# Patient Record
Sex: FEMALE | Race: WHITE | Employment: OTHER | URBAN - METROPOLITAN AREA
[De-identification: names, ages, dates, MRNs, and addresses within clinical notes are randomized per-mention and may not be internally consistent; named-entity substitution may affect disease eponyms.]

---

## 2017-05-13 ENCOUNTER — APPOINTMENT (OUTPATIENT)
Dept: CT IMAGING | Age: 42
End: 2017-05-13
Payer: MEDICARE

## 2017-05-13 ENCOUNTER — HOSPITAL ENCOUNTER (EMERGENCY)
Age: 42
Discharge: HOME | End: 2017-05-13
Attending: EMERGENCY MEDICINE
Payer: MEDICARE

## 2017-05-13 VITALS
DIASTOLIC BLOOD PRESSURE: 64 MMHG | HEIGHT: 58 IN | WEIGHT: 206.79 LBS | HEART RATE: 50 BPM | RESPIRATION RATE: 12 BRPM | OXYGEN SATURATION: 95 % | SYSTOLIC BLOOD PRESSURE: 103 MMHG | TEMPERATURE: 97.3 F | BODY MASS INDEX: 43.41 KG/M2

## 2017-05-13 DIAGNOSIS — R51.9 NONINTRACTABLE HEADACHE, UNSPECIFIED CHRONICITY PATTERN, UNSPECIFIED HEADACHE TYPE: Primary | ICD-10-CM

## 2017-05-13 PROCEDURE — 6370000000 HC RX 637 (ALT 250 FOR IP): Performed by: NURSE PRACTITIONER

## 2017-05-13 PROCEDURE — 99284 EMERGENCY DEPT VISIT MOD MDM: CPT

## 2017-05-13 PROCEDURE — 96372 THER/PROPH/DIAG INJ SC/IM: CPT

## 2017-05-13 PROCEDURE — 6360000002 HC RX W HCPCS: Performed by: NURSE PRACTITIONER

## 2017-05-13 PROCEDURE — 70450 CT HEAD/BRAIN W/O DYE: CPT

## 2017-05-13 RX ORDER — KETOROLAC TROMETHAMINE 30 MG/ML
60 INJECTION, SOLUTION INTRAMUSCULAR; INTRAVENOUS ONCE
Status: COMPLETED | OUTPATIENT
Start: 2017-05-13 | End: 2017-05-13

## 2017-05-13 RX ORDER — ONDANSETRON 4 MG/1
4 TABLET, ORALLY DISINTEGRATING ORAL ONCE
Status: COMPLETED | OUTPATIENT
Start: 2017-05-13 | End: 2017-05-13

## 2017-05-13 RX ORDER — DIPHENHYDRAMINE HYDROCHLORIDE 50 MG/ML
25 INJECTION INTRAMUSCULAR; INTRAVENOUS ONCE
Status: COMPLETED | OUTPATIENT
Start: 2017-05-13 | End: 2017-05-13

## 2017-05-13 RX ORDER — KETOROLAC TROMETHAMINE 30 MG/ML
60 INJECTION, SOLUTION INTRAMUSCULAR; INTRAVENOUS ONCE
Status: DISCONTINUED | OUTPATIENT
Start: 2017-05-13 | End: 2017-05-13

## 2017-05-13 RX ORDER — BUTALBITAL, ACETAMINOPHEN AND CAFFEINE 50; 325; 40 MG/1; MG/1; MG/1
1 TABLET ORAL ONCE
Status: COMPLETED | OUTPATIENT
Start: 2017-05-13 | End: 2017-05-13

## 2017-05-13 RX ORDER — RIZATRIPTAN BENZOATE 10 MG/1
10 TABLET ORAL
COMMUNITY

## 2017-05-13 RX ADMIN — BUTALBITAL, ACETAMINOPHEN, AND CAFFEINE 1 TABLET: 50; 325; 40 TABLET ORAL at 11:55

## 2017-05-13 RX ADMIN — ONDANSETRON 4 MG: 4 TABLET, ORALLY DISINTEGRATING ORAL at 11:57

## 2017-05-13 RX ADMIN — DIPHENHYDRAMINE HYDROCHLORIDE 25 MG: 50 INJECTION, SOLUTION INTRAMUSCULAR; INTRAVENOUS at 12:40

## 2017-05-13 RX ADMIN — KETOROLAC TROMETHAMINE 60 MG: 30 INJECTION, SOLUTION INTRAMUSCULAR at 11:56

## 2017-05-13 ASSESSMENT — PAIN SCALES - GENERAL
PAINLEVEL_OUTOF10: 9
PAINLEVEL_OUTOF10: 9
PAINLEVEL_OUTOF10: 8
PAINLEVEL_OUTOF10: 9

## 2017-05-13 ASSESSMENT — ENCOUNTER SYMPTOMS
SHORTNESS OF BREATH: 0
RHINORRHEA: 0
SORE THROAT: 0
ABDOMINAL PAIN: 0
NAUSEA: 1
EYE PAIN: 0
COUGH: 0
PHOTOPHOBIA: 0
COLOR CHANGE: 0
VOMITING: 0
SINUS PRESSURE: 0

## 2017-05-13 ASSESSMENT — PAIN DESCRIPTION - DESCRIPTORS
DESCRIPTORS: PRESSURE
DESCRIPTORS: ACHING;THROBBING

## 2017-05-13 ASSESSMENT — PAIN DESCRIPTION - PROGRESSION: CLINICAL_PROGRESSION: NOT CHANGED

## 2017-05-13 ASSESSMENT — PAIN DESCRIPTION - LOCATION
LOCATION: HEAD
LOCATION: HEAD

## 2017-05-13 ASSESSMENT — PAIN DESCRIPTION - PAIN TYPE: TYPE: ACUTE PAIN;CHRONIC PAIN

## 2018-06-10 ENCOUNTER — APPOINTMENT (OUTPATIENT)
Dept: GENERAL RADIOLOGY | Facility: CLINIC | Age: 43
End: 2018-06-10
Payer: MEDICARE

## 2018-06-10 ENCOUNTER — HOSPITAL ENCOUNTER (EMERGENCY)
Facility: CLINIC | Age: 43
Discharge: HOME | End: 2018-06-10
Attending: EMERGENCY MEDICINE
Payer: MEDICARE

## 2018-06-10 VITALS
RESPIRATION RATE: 18 BRPM | HEIGHT: 58 IN | HEART RATE: 98 BPM | WEIGHT: 200 LBS | BODY MASS INDEX: 41.98 KG/M2 | SYSTOLIC BLOOD PRESSURE: 119 MMHG | TEMPERATURE: 98 F | DIASTOLIC BLOOD PRESSURE: 78 MMHG | OXYGEN SATURATION: 96 %

## 2018-06-10 DIAGNOSIS — S39.92XA INJURY OF SACRUM, INITIAL ENCOUNTER: Primary | ICD-10-CM

## 2018-06-10 PROCEDURE — 72100 X-RAY EXAM L-S SPINE 2/3 VWS: CPT

## 2018-06-10 PROCEDURE — 99284 EMERGENCY DEPT VISIT MOD MDM: CPT

## 2018-06-10 PROCEDURE — 72220 X-RAY EXAM SACRUM TAILBONE: CPT

## 2018-06-10 RX ORDER — CYCLOBENZAPRINE HCL 10 MG
10 TABLET ORAL 3 TIMES DAILY PRN
Qty: 21 TABLET | Refills: 0 | Status: SHIPPED | OUTPATIENT
Start: 2018-06-10 | End: 2018-06-17

## 2018-06-10 RX ORDER — CLONAZEPAM 1 MG/1
1 TABLET ORAL 2 TIMES DAILY
COMMUNITY

## 2018-06-10 RX ORDER — IBUPROFEN 600 MG/1
600 TABLET ORAL EVERY 6 HOURS PRN
Qty: 30 TABLET | Refills: 0 | Status: SHIPPED | OUTPATIENT
Start: 2018-06-10

## 2018-06-10 ASSESSMENT — PAIN DESCRIPTION - PAIN TYPE: TYPE: ACUTE PAIN

## 2018-06-10 ASSESSMENT — PAIN DESCRIPTION - FREQUENCY: FREQUENCY: CONTINUOUS

## 2018-06-10 ASSESSMENT — ENCOUNTER SYMPTOMS: BACK PAIN: 1

## 2018-06-10 ASSESSMENT — PAIN DESCRIPTION - LOCATION: LOCATION: SACRUM

## 2018-06-10 ASSESSMENT — PAIN DESCRIPTION - DESCRIPTORS: DESCRIPTORS: SHARP

## 2018-06-10 ASSESSMENT — PAIN SCALES - GENERAL: PAINLEVEL_OUTOF10: 8

## 2020-09-14 ENCOUNTER — APPOINTMENT (OUTPATIENT)
Dept: GENERAL RADIOLOGY | Age: 45
DRG: 918 | End: 2020-09-14
Payer: MEDICARE

## 2020-09-14 ENCOUNTER — HOSPITAL ENCOUNTER (INPATIENT)
Age: 45
LOS: 2 days | Discharge: PSYCHIATRIC OR SUBSTANCE USE FACILITY | DRG: 918 | End: 2020-09-16
Attending: STUDENT IN AN ORGANIZED HEALTH CARE EDUCATION/TRAINING PROGRAM | Admitting: INTERNAL MEDICINE
Payer: MEDICARE

## 2020-09-14 PROBLEM — T14.91XA SUICIDE ATTEMPT (HCC): Status: ACTIVE | Noted: 2020-09-14

## 2020-09-14 LAB
ABSOLUTE EOS #: 0.05 K/UL (ref 0–0.44)
ABSOLUTE IMMATURE GRANULOCYTE: 0.05 K/UL (ref 0–0.3)
ABSOLUTE LYMPH #: 2.12 K/UL (ref 1.1–3.7)
ABSOLUTE MONO #: 0.36 K/UL (ref 0.1–1.2)
ACETAMINOPHEN LEVEL: <5 UG/ML (ref 10–30)
ALBUMIN SERPL-MCNC: 3.6 G/DL (ref 3.5–5.2)
ALBUMIN/GLOBULIN RATIO: 1 (ref 1–2.5)
ALP BLD-CCNC: 70 U/L (ref 35–104)
ALT SERPL-CCNC: 11 U/L (ref 5–33)
AMPHETAMINE SCREEN URINE: NEGATIVE
ANION GAP SERPL CALCULATED.3IONS-SCNC: 13 MMOL/L (ref 9–17)
AST SERPL-CCNC: 23 U/L
BARBITURATE SCREEN URINE: NEGATIVE
BASOPHILS # BLD: 1 % (ref 0–2)
BASOPHILS ABSOLUTE: 0.07 K/UL (ref 0–0.2)
BENZODIAZEPINE SCREEN, URINE: NEGATIVE
BILIRUB SERPL-MCNC: 0.23 MG/DL (ref 0.3–1.2)
BUN BLDV-MCNC: 14 MG/DL (ref 6–20)
BUN/CREAT BLD: ABNORMAL (ref 9–20)
BUPRENORPHINE URINE: NORMAL
CALCIUM SERPL-MCNC: 9 MG/DL (ref 8.6–10.4)
CANNABINOID SCREEN URINE: NEGATIVE
CHLORIDE BLD-SCNC: 104 MMOL/L (ref 98–107)
CO2: 19 MMOL/L (ref 20–31)
COCAINE METABOLITE, URINE: NEGATIVE
CREAT SERPL-MCNC: 1.01 MG/DL (ref 0.5–0.9)
DIFFERENTIAL TYPE: ABNORMAL
EOSINOPHILS RELATIVE PERCENT: 1 % (ref 1–4)
ETHANOL PERCENT: <0.01 %
ETHANOL: <10 MG/DL
GFR AFRICAN AMERICAN: >60 ML/MIN
GFR NON-AFRICAN AMERICAN: 59 ML/MIN
GFR SERPL CREATININE-BSD FRML MDRD: ABNORMAL ML/MIN/{1.73_M2}
GFR SERPL CREATININE-BSD FRML MDRD: ABNORMAL ML/MIN/{1.73_M2}
GLUCOSE BLD-MCNC: 132 MG/DL (ref 70–99)
HCG QUALITATIVE: NEGATIVE
HCT VFR BLD CALC: 40.8 % (ref 36.3–47.1)
HEMOGLOBIN: 12.8 G/DL (ref 11.9–15.1)
IMMATURE GRANULOCYTES: 1 %
LYMPHOCYTES # BLD: 26 % (ref 24–43)
MAGNESIUM: 2 MG/DL (ref 1.6–2.6)
MCH RBC QN AUTO: 27.5 PG (ref 25.2–33.5)
MCHC RBC AUTO-ENTMCNC: 31.4 G/DL (ref 28.4–34.8)
MCV RBC AUTO: 87.7 FL (ref 82.6–102.9)
MDMA URINE: NORMAL
METHADONE SCREEN, URINE: NEGATIVE
METHAMPHETAMINE, URINE: NORMAL
MONOCYTES # BLD: 5 % (ref 3–12)
NRBC AUTOMATED: 0 PER 100 WBC
OPIATES, URINE: NEGATIVE
OXYCODONE SCREEN URINE: NEGATIVE
PDW BLD-RTO: 14.1 % (ref 11.8–14.4)
PHENCYCLIDINE, URINE: NEGATIVE
PLATELET # BLD: 345 K/UL (ref 138–453)
PLATELET ESTIMATE: ABNORMAL
PMV BLD AUTO: 10 FL (ref 8.1–13.5)
POTASSIUM SERPL-SCNC: 4.6 MMOL/L (ref 3.7–5.3)
PROPOXYPHENE, URINE: NORMAL
RBC # BLD: 4.65 M/UL (ref 3.95–5.11)
RBC # BLD: ABNORMAL 10*6/UL
SALICYLATE LEVEL: <1 MG/DL (ref 3–10)
SEG NEUTROPHILS: 66 % (ref 36–65)
SEGMENTED NEUTROPHILS ABSOLUTE COUNT: 5.4 K/UL (ref 1.5–8.1)
SODIUM BLD-SCNC: 136 MMOL/L (ref 135–144)
TEST INFORMATION: NORMAL
TOTAL PROTEIN: 7.1 G/DL (ref 6.4–8.3)
TOXIC TRICYCLIC SC,BLOOD: NEGATIVE
TRICYCLIC ANTIDEPRESSANTS, UR: NORMAL
TROPONIN INTERP: NORMAL
TROPONIN INTERP: NORMAL
TROPONIN T: NORMAL NG/ML
TROPONIN T: NORMAL NG/ML
TROPONIN, HIGH SENSITIVITY: <6 NG/L (ref 0–14)
TROPONIN, HIGH SENSITIVITY: <6 NG/L (ref 0–14)
TSH SERPL DL<=0.05 MIU/L-ACNC: 0.99 MIU/L (ref 0.3–5)
WBC # BLD: 8.1 K/UL (ref 3.5–11.3)
WBC # BLD: ABNORMAL 10*3/UL

## 2020-09-14 PROCEDURE — 83735 ASSAY OF MAGNESIUM: CPT

## 2020-09-14 PROCEDURE — 6360000002 HC RX W HCPCS: Performed by: STUDENT IN AN ORGANIZED HEALTH CARE EDUCATION/TRAINING PROGRAM

## 2020-09-14 PROCEDURE — 80053 COMPREHEN METABOLIC PANEL: CPT

## 2020-09-14 PROCEDURE — 93005 ELECTROCARDIOGRAM TRACING: CPT | Performed by: GENERAL PRACTICE

## 2020-09-14 PROCEDURE — 84703 CHORIONIC GONADOTROPIN ASSAY: CPT

## 2020-09-14 PROCEDURE — 85025 COMPLETE CBC W/AUTO DIFF WBC: CPT

## 2020-09-14 PROCEDURE — 99223 1ST HOSP IP/OBS HIGH 75: CPT | Performed by: INTERNAL MEDICINE

## 2020-09-14 PROCEDURE — 80307 DRUG TEST PRSMV CHEM ANLYZR: CPT

## 2020-09-14 PROCEDURE — 84484 ASSAY OF TROPONIN QUANT: CPT

## 2020-09-14 PROCEDURE — 71045 X-RAY EXAM CHEST 1 VIEW: CPT

## 2020-09-14 PROCEDURE — 2000000000 HC ICU R&B

## 2020-09-14 PROCEDURE — 2580000003 HC RX 258: Performed by: STUDENT IN AN ORGANIZED HEALTH CARE EDUCATION/TRAINING PROGRAM

## 2020-09-14 PROCEDURE — G0480 DRUG TEST DEF 1-7 CLASSES: HCPCS

## 2020-09-14 PROCEDURE — 37799 UNLISTED PX VASCULAR SURGERY: CPT

## 2020-09-14 PROCEDURE — 2580000003 HC RX 258: Performed by: GENERAL PRACTICE

## 2020-09-14 PROCEDURE — 84443 ASSAY THYROID STIM HORMONE: CPT

## 2020-09-14 PROCEDURE — 99285 EMERGENCY DEPT VISIT HI MDM: CPT

## 2020-09-14 PROCEDURE — 87641 MR-STAPH DNA AMP PROBE: CPT

## 2020-09-14 RX ORDER — SODIUM CHLORIDE 9 MG/ML
INJECTION, SOLUTION INTRAVENOUS CONTINUOUS
Status: DISCONTINUED | OUTPATIENT
Start: 2020-09-14 | End: 2020-09-15

## 2020-09-14 RX ORDER — ZOLPIDEM TARTRATE 10 MG/1
40 TABLET ORAL NIGHTLY PRN
COMMUNITY

## 2020-09-14 RX ORDER — 0.9 % SODIUM CHLORIDE 0.9 %
1000 INTRAVENOUS SOLUTION INTRAVENOUS ONCE
Status: COMPLETED | OUTPATIENT
Start: 2020-09-14 | End: 2020-09-14

## 2020-09-14 RX ORDER — BENZTROPINE MESYLATE 0.5 MG/1
0.5 TABLET ORAL DAILY
COMMUNITY

## 2020-09-14 RX ORDER — MIRTAZAPINE 30 MG/1
30 TABLET, FILM COATED ORAL NIGHTLY
COMMUNITY

## 2020-09-14 RX ORDER — POTASSIUM CHLORIDE 20 MEQ/1
40 TABLET, EXTENDED RELEASE ORAL PRN
Status: DISCONTINUED | OUTPATIENT
Start: 2020-09-14 | End: 2020-09-16 | Stop reason: HOSPADM

## 2020-09-14 RX ORDER — ACETAMINOPHEN 325 MG/1
650 TABLET ORAL EVERY 6 HOURS PRN
Status: DISCONTINUED | OUTPATIENT
Start: 2020-09-14 | End: 2020-09-16 | Stop reason: HOSPADM

## 2020-09-14 RX ORDER — VENLAFAXINE 100 MG/1
300 TABLET ORAL
Status: ON HOLD | COMMUNITY
End: 2020-09-15 | Stop reason: ALTCHOICE

## 2020-09-14 RX ORDER — ARIPIPRAZOLE 20 MG/1
20 TABLET ORAL DAILY
COMMUNITY

## 2020-09-14 RX ORDER — ONDANSETRON 2 MG/ML
4 INJECTION INTRAMUSCULAR; INTRAVENOUS EVERY 6 HOURS PRN
Status: DISCONTINUED | OUTPATIENT
Start: 2020-09-14 | End: 2020-09-16 | Stop reason: HOSPADM

## 2020-09-14 RX ORDER — PROMETHAZINE HYDROCHLORIDE 12.5 MG/1
12.5 TABLET ORAL EVERY 6 HOURS PRN
Status: ON HOLD | COMMUNITY
End: 2020-09-15 | Stop reason: ALTCHOICE

## 2020-09-14 RX ORDER — SODIUM CHLORIDE 0.9 % (FLUSH) 0.9 %
10 SYRINGE (ML) INJECTION PRN
Status: DISCONTINUED | OUTPATIENT
Start: 2020-09-14 | End: 2020-09-16 | Stop reason: HOSPADM

## 2020-09-14 RX ORDER — ACETAMINOPHEN 650 MG/1
650 SUPPOSITORY RECTAL EVERY 6 HOURS PRN
Status: DISCONTINUED | OUTPATIENT
Start: 2020-09-14 | End: 2020-09-16 | Stop reason: HOSPADM

## 2020-09-14 RX ORDER — SODIUM CHLORIDE 0.9 % (FLUSH) 0.9 %
10 SYRINGE (ML) INJECTION EVERY 12 HOURS SCHEDULED
Status: DISCONTINUED | OUTPATIENT
Start: 2020-09-14 | End: 2020-09-16 | Stop reason: HOSPADM

## 2020-09-14 RX ORDER — POTASSIUM CHLORIDE 7.45 MG/ML
10 INJECTION INTRAVENOUS PRN
Status: DISCONTINUED | OUTPATIENT
Start: 2020-09-14 | End: 2020-09-16 | Stop reason: HOSPADM

## 2020-09-14 RX ORDER — PROMETHAZINE HYDROCHLORIDE 12.5 MG/1
12.5 TABLET ORAL EVERY 6 HOURS PRN
Status: DISCONTINUED | OUTPATIENT
Start: 2020-09-14 | End: 2020-09-16 | Stop reason: HOSPADM

## 2020-09-14 RX ORDER — MAGNESIUM OXIDE 400 MG/1
400 TABLET ORAL DAILY
COMMUNITY

## 2020-09-14 RX ORDER — POLYETHYLENE GLYCOL 3350 17 G/17G
17 POWDER, FOR SOLUTION ORAL DAILY PRN
Status: DISCONTINUED | OUTPATIENT
Start: 2020-09-14 | End: 2020-09-16 | Stop reason: HOSPADM

## 2020-09-14 RX ADMIN — SODIUM CHLORIDE 1000 ML: 9 INJECTION, SOLUTION INTRAVENOUS at 12:51

## 2020-09-14 RX ADMIN — SODIUM CHLORIDE 1000 ML: 9 INJECTION, SOLUTION INTRAVENOUS at 18:41

## 2020-09-14 RX ADMIN — ENOXAPARIN SODIUM 40 MG: 40 INJECTION SUBCUTANEOUS at 17:36

## 2020-09-14 RX ADMIN — SODIUM CHLORIDE: 9 INJECTION, SOLUTION INTRAVENOUS at 16:40

## 2020-09-14 ASSESSMENT — ENCOUNTER SYMPTOMS
SHORTNESS OF BREATH: 0
PHOTOPHOBIA: 0
NAUSEA: 0
COUGH: 0
EYE DISCHARGE: 0
VOMITING: 0

## 2020-09-14 ASSESSMENT — PAIN DESCRIPTION - LOCATION: LOCATION: HEAD

## 2020-09-14 ASSESSMENT — PAIN SCALES - GENERAL: PAINLEVEL_OUTOF10: 6

## 2020-09-14 ASSESSMENT — PAIN DESCRIPTION - PAIN TYPE: TYPE: ACUTE PAIN

## 2020-09-14 NOTE — ED PROVIDER NOTES
[facility]     Emergency Department     Faculty Note/ Attestation      Pt Name: Leona Muller                                       MRN: [id number]  [place] 1975  Date of evaluation: 9/14/2020  Patients PCP:    Olga De Jesus MD    Attestation  I performed a history and physical examination of the patient/ or directly observed  and discussed management with the resident. I reviewed the residents note and agree with the documented findings and plan of care. Any areas of disagreement are noted on the chart. I was personally present for the key portions of any procedures. I have documented in the chart those procedures where I was not present during the key portions. I have reviewed the emergency nurses triage note. I agree with the chief complaint, past medical history, past surgical history, allergies, medications, social and family history as documented unless otherwise noted below. For Physician Assistant/ Nurse Practitioner cases/documentation I have personally evaluated this patient and have completed at least one if not all key elements of the E/M (history, physical exam, and MDM). Additional findings are as noted. Initial Screens:             Vitals:    Vitals:    09/14/20 1148 09/14/20 1201   BP:  122/76   Pulse: 88 86   Resp:  14   Temp: 98.5 °F (36.9 °C)    TempSrc: Oral    SpO2: 92% 96%   Weight: 200 lb (90.7 kg)    Height: 4' 10\" (1.473 m)        Chief Complaint      Chief Complaint   Patient presents with    Drug Overdose     intentional to harm self. height is 4' 10\" (1.473 m) and weight is 200 lb (90.7 kg). Her oral temperature is 98.5 °F (36.9 °C). Her blood pressure is 122/76 and her pulse is 86. Her respiration is 14 and oxygen saturation is 96%.             DIAGNOSTIC RESULTS       RADIOLOGY:   XR CHEST PORTABLE    (Results Pending)         LABS:  Labs Reviewed   CBC WITH AUTO DIFFERENTIAL   COMPREHENSIVE METABOLIC PANEL   TOX SCR, BLD, ED TROPONIN   TROPONIN   TSH WITH REFLEX   MAGNESIUM   URINE DRUG SCREEN         EMERGENCY DEPARTMENT COURSE:     -------------------------      BP: 122/76, Temp: 98.5 °F (36.9 °C), Pulse: 86, Resp: 14    System Problem List     Patient Active Problem List   Diagnosis    Severe episode of recurrent major depressive disorder, without psychotic features ([facility]    Hypothyroidism    Headache    PTSD (post-traumatic stress disorder)    Suicide attempt by drug ingestion ([facility]    Hypothyroidism due to acquired atrophy of thyroid         Comments  Chronic Prob List noted    Intentional drug overdose. Hx of prior suicide attempt. Sent here from her psychiatrists office.    Took per report 50 tablets of 10mg Ambien, 12 of 2mg minipress, 3 of 20mg abilify, 3 of 0.5mg cogentin   BP stable on arrival, HR normal  Patient is drowsy, slurring her speech, however answering questions appropriately, moving all extremities  Will check basic labs, tox screen, tsh, ekg, touch base w/ poison control, SW consult will need inpatient psych admission once medically clear    EKG Interpretation    Interpreted by emergency department physician    Rhythm: normal sinus   Rate: normal  Axis: normal  Ectopy: none  Conduction: normal  ST Segments: no acute change  T Waves: non specific changes  Q Waves: none    Clinical Impression: no acute changes and non-specific EKG    Radha Villafana DO  Attending Emergency Physician        Radha Villafana DO  09/14/20 6219

## 2020-09-14 NOTE — ED PROVIDER NOTES
[facility]  Emergency Department Encounter  EmergencyMedicine Resident     Pt Trisha Ramsay  MRN: [id number]  [place] 1975  Date of evaluation: 20  PCP:  Jassi Carmona MD    [address] [place] [address]       Chief Complaint   Patient presents with    Drug Overdose     intentional to harm self. HISTORY OF PRESENT ILLNESS  (Location/Symptom, Timing/Onset, Context/Setting, Quality, Duration, Modifying Factors, Severity.)      Valentino Stains is a 39 y.o. female who presents with suicide attempt. Patient was at her psychiatrist's office, and stated that she took 5010 mg Ambien 12 Minipress, 320 mg Abilify 3 Cogentin. Patient states she has a history of this in the past, try to commit suicide similar to this several years ago for which she required intubation. Patient took these medications approximately 2 hours prior to arrival.  Patient states she feels sleepy denies any pain or any other coingestions other than the ones mentioned above. PAST MEDICAL / SURGICAL / SOCIAL / FAMILY HISTORY      has a past medical history of Depression, Headache(784.0), Hypothyroidism, and PTSD (post-traumatic stress disorder). has a past surgical history that includes Cholecystectomy;  section; and Lithotripsy.     Social History     Socioeconomic History    Marital status:      Spouse name: Not on file    Number of children: Not on file    Years of education: Not on file    Highest education level: Not on file   Occupational History    Not on file   Social Needs    Financial resource strain: Not on file    Food insecurity     Worry: Not on file     Inability: Not on file    Transportation needs     Medical: Not on file     Non-medical: Not on file   Tobacco Use    Smoking status: Former Smoker    Smokeless tobacco: Never Used   Substance and Sexual Activity    Alcohol use: No    Drug use: No    Sexual activity: Not on file   Lifestyle    Physical activity     Days per week: Not on file     Minutes per session: Not on file    Stress: Not on file   Relationships    Social connections     Talks on phone: Not on file     Gets together: Not on file     Attends Hindu service: Not on file     Active member of club or organization: Not on file     Attends meetings of clubs or organizations: Not on file     Relationship status: Not on file    Intimate partner violence     Fear of current or ex partner: Not on file     Emotionally abused: Not on file     Physically abused: Not on file     Forced sexual activity: Not on file   Other Topics Concern    Not on file   Social History Narrative    Not on file       Family History   Problem Relation Age of Onset    COPD Sister     Diabetes Sister     Cancer Mother        Allergies:  Ciprofloxacin    Home Medications:  Prior to Admission medications    Medication Sig Start Date End Date Taking? Authorizing Provider   magnesium oxide (MAG-OX) 400 MG tablet Take 400 mg by mouth daily   Yes Historical Provider, MD   promethazine (PHENERGAN) 12.5 MG tablet Take 12.5 mg by mouth every 6 hours as needed for Nausea   Yes Historical Provider, MD   mirtazapine (REMERON) 30 MG tablet Take 30 mg by mouth nightly   Yes Historical Provider, MD   ARIPiprazole (ABILIFY) 20 MG tablet Take 20 mg by mouth daily   Yes Historical Provider, MD   venlafaxine (EFFEXOR) 100 MG tablet Take 300 mg by mouth every morning (before breakfast)   Yes Historical Provider, MD   benztropine (COGENTIN) 0.5 MG tablet Take 0.5 mg by mouth daily   Yes Historical Provider, MD   zolpidem (AMBIEN) 10 MG tablet Take 40 mg by mouth nightly as needed for Sleep.    Yes Historical Provider, MD   rizatriptan (MAXALT) 10 MG tablet Take 10 mg by mouth once as needed for Migraine May repeat in 2 hours if needed   Yes Historical Provider, MD   prazosin (MINIPRESS) 2 MG capsule Take 1 capsule by mouth nightly 9/21/15  Yes Dian Rawls MD   levothyroxine (SYNTHROID) 88 MCG tablet Take 88 mcg by mouth Daily   Yes Historical Provider, MD   clonazePAM (KLONOPIN) 1 MG tablet Take 1 mg by mouth 2 times daily. Vinny Butts Historical Provider, MD   ibuprofen (ADVIL;MOTRIN) 600 MG tablet Take 1 tablet by mouth every 6 hours as needed for Pain 6/10/18   Pennie Cardona MD       REVIEW OF SYSTEMS    (2-9 systems for level 4, 10 or more for level 5)      Review of Systems   Constitutional: Positive for activity change and fatigue. Negative for fever. Eyes: Negative for photophobia, discharge and visual disturbance. Respiratory: Negative for cough and shortness of breath. Cardiovascular: Negative for chest pain. Gastrointestinal: Negative for nausea and vomiting. Skin: Negative for rash. Neurological: Negative for dizziness and headaches. Psychiatric/Behavioral: Positive for agitation, behavioral problems and confusion. The patient is not nervous/anxious. PHYSICAL EXAM   (up to 7 for level 4, 8 or more for level 5)      INITIAL VITALS:   BP 90/73   Pulse 70   Temp 98.5 °F (36.9 °C) (Oral)   Resp 23   Ht 4' 10\" (1.473 m)   Wt 200 lb (90.7 kg)   LMP 08/14/2020   SpO2 98%   BMI 41.80 kg/m²     Physical Exam  Constitutional:       Comments: Patient is obese, appears drowsy, is not diaphoretic or in acute distress   HENT:      Mouth/Throat:      Mouth: Mucous membranes are moist.      Pharynx: No oropharyngeal exudate or posterior oropharyngeal erythema. Eyes:      Pupils: Pupils are equal, round, and reactive to light. Comments: Horizontal nystagmus noted   Cardiovascular:      Rate and Rhythm: Normal rate. Pulses: Normal pulses. Heart sounds: Normal heart sounds. Pulmonary:      Comments: Patient is tachypneic, lungs are clear to auscultation bilaterally  Abdominal:      General: There is no distension. Tenderness: There is no abdominal tenderness. There is no right CVA tenderness, left CVA tenderness, guarding or rebound. Hernia: No hernia is present.    Skin: General: Skin is warm. Neurological:      Sensory: No sensory deficit.       Comments: Patient is answering questions, speech is slurred, patient appears somnolent   Psychiatric:      Comments: Depressed mood, abnormal thought and judgment         DIFFERENTIAL  DIAGNOSIS     PLAN (Edmond Lazo / Kirk Larson / EKG):  Orders Placed This Encounter   Procedures    MRSA DNA Probe, Nasal    XR CHEST PORTABLE    CBC Auto Differential    Comprehensive Metabolic Panel    TOX SCR, BLD, ED    Troponin    TSH with Reflex    Magnesium    Urine Drug Screen    HCG Qualitative, Serum    Comprehensive Metabolic Panel w/ Reflex to MG    CBC auto differential    Diet NPO Effective Now    Telemetry Monitoring    Vital signs per unit routine    Notify physician    Telemetry monitoring - continuous duration    Strict Bedrest    Daily weights    Intake and output    Neurovascular checks    Monitor for signs/symptoms of urinary retention    Place intermittent pneumatic compression device    Full Code    Inpatient consult to Social Work    Inpatient consult to Critical Care    Initiate Oxygen Therapy Protocol    EKG 12 Lead    PATIENT STATUS (FROM ED OR OR/PROCEDURAL) Inpatient    Suicide precautions       MEDICATIONS ORDERED:  Orders Placed This Encounter   Medications    0.9 % sodium chloride bolus    sodium chloride flush 0.9 % injection 10 mL    sodium chloride flush 0.9 % injection 10 mL    OR Linked Order Group     acetaminophen (TYLENOL) tablet 650 mg     acetaminophen (TYLENOL) suppository 650 mg    polyethylene glycol (GLYCOLAX) packet 17 g    OR Linked Order Group     promethazine (PHENERGAN) tablet 12.5 mg     ondansetron (ZOFRAN) injection 4 mg    0.9 % sodium chloride infusion    OR Linked Order Group     potassium chloride (KLOR-CON M) extended release tablet 40 mEq     potassium bicarb-citric acid (EFFER-K) effervescent tablet 40 mEq     potassium chloride 10 mEq/100 mL IVPB (Peripheral Line) Result Value Ref Range    Acetaminophen Level <5 (L) 10 - 30 ug/mL    Ethanol <10 <10 mg/dL    Ethanol percent <7.621 <3.358 %    Salicylate Lvl <1 (L) 3 - 10 mg/dL    Toxic Tricyclic Sc,Blood NEGATIVE NEGATIVE   Troponin   Result Value Ref Range    Troponin, High Sensitivity <6 0 - 14 ng/L    Troponin T NOT REPORTED <0.03 ng/mL    Troponin Interp NOT REPORTED    Troponin   Result Value Ref Range    Troponin, High Sensitivity <6 0 - 14 ng/L    Troponin T NOT REPORTED <0.03 ng/mL    Troponin Interp NOT REPORTED    TSH with Reflex   Result Value Ref Range    TSH 0.99 0.30 - 5.00 mIU/L   Magnesium   Result Value Ref Range    Magnesium 2.0 1.6 - 2.6 mg/dL   Urine Drug Screen   Result Value Ref Range    Amphetamine Screen, Ur NEGATIVE NEGATIVE    Barbiturate Screen, Ur NEGATIVE NEGATIVE    Benzodiazepine Screen, Urine NEGATIVE NEGATIVE    Cocaine Metabolite, Urine NEGATIVE NEGATIVE    Methadone Screen, Urine NEGATIVE NEGATIVE    Opiates, Urine NEGATIVE NEGATIVE    Phencyclidine, Urine NEGATIVE NEGATIVE    Propoxyphene, Urine NOT REPORTED NEGATIVE    Cannabinoid Scrn, Ur NEGATIVE NEGATIVE    Oxycodone Screen, Ur NEGATIVE NEGATIVE    Methamphetamine, Urine NOT REPORTED NEGATIVE    Tricyclic Antidepressants, Urine NOT REPORTED NEGATIVE    MDMA, Urine NOT REPORTED NEGATIVE    Buprenorphine Urine NOT REPORTED NEGATIVE    Test Information       Assay provides medical screening only. The absence of expected drug(s) and/or metabolite(s) may indicate diluted or adulterated urine, limitations of testing or timing of collection. HCG Qualitative, Serum   Result Value Ref Range    hCG Qual NEGATIVE NEGATIVE           RADIOLOGY:  None    EKG  Normal sinus rhythm rate of 86 bpm, normal axis, normal intervals  QRS 80, good R wave progression, T wave flattening in the anterior lateral leads, no acute ST wave abnormalities. No signs of acute ischemia.     All EKG's are interpreted by the Emergency Department Physician who either signs or Co-signs this chart in the absence of a cardiologist.    EMERGENCY DEPARTMENT COURSE/IMPRESSION: 22-year-old obese female presented emergency department via EMS after suicide attempt with polysubstance, patient took 50 Ambien 12 Minipress, 3 Abilify, 3 Cogentin, patient is somnolent, drowsy, discussed with poison control, patient was hemodynamically stable upon arrival, patient did have slight change in her blood pressure after 2 hours of observation with systolic in the 59C, map greater than 65, patient was given fluid bolus was fluid responsive. Discussed with critical care team will plan to admit to their service for observations patient does need observation for 10 hours spoke with the clinical pharmacist and maximal onset approximately 4 to 6 hours after ingestion of her medications. Patient was having hallucinations, speech became less understandable, critical care team evaluated patient will plan to set up with her service. Patient protecting her airway satting 98% on 2 L nasal cannula when sent to the ICU. PROCEDURES:  None    CONSULTS:  IP CONSULT TO SOCIAL WORK  IP CONSULT TO CRITICAL CARE    CRITICAL CARE:  None    FINAL IMPRESSION      1.  Intentional drug overdose, initial encounter [facility])          DISPOSITION / [address] Admitted 09/14/2020 03:00:09 PM      PATIENT REFERRED TO:  Angel Wharton MD  [address] [place] [address]  [phone]            DISCHARGE MEDICATIONS:  Current Discharge Medication List          Sharyle Heal, DO  Emergency Medicine Resident    (Please note that portions of thisnote were completed with a voice recognition program.  Efforts were made to edit the dictations but occasionally words are mis-transcribed.)     Sharyle Heal, DO  Resident  09/14/20 6901

## 2020-09-14 NOTE — ED NOTES
Pt is brought by EMS to room 23. She reports that she's been depressed since yesterday. She states her sister is ill and is in the hospital with pneumonia. She states \"she's the only family I've got. \"  Pt states she lives at home with  and child. She states that at 1030 today, she started taking ambien to try to harm herself. She states that since then, she's taken a total of 50 ambien, 12 minipress, 3 abilify, and 3 cogentin. She states that she took some ambien on her way to her psychiatrist office, of which she drove herself. She states that she's tried to commit suicide before (2016) as a drug overdose. She is sleepy, but oriented. She denies pain.       Giancarlo Ariza RN  09/14/20 0678

## 2020-09-14 NOTE — ED NOTES
Bed: 23  Expected date:   Expected time:   Means of arrival:   Comments:  Fermin Calvillo RN  09/14/20 1145

## 2020-09-14 NOTE — H&P
Prior to Admission medications    Medication Sig Start Date End Date Taking? Authorizing Provider   magnesium oxide (MAG-OX) 400 MG tablet Take 400 mg by mouth daily   Yes Historical Provider, MD   promethazine (PHENERGAN) 12.5 MG tablet Take 12.5 mg by mouth every 6 hours as needed for Nausea   Yes Historical Provider, MD   mirtazapine (REMERON) 30 MG tablet Take 30 mg by mouth nightly   Yes Historical Provider, MD   ARIPiprazole (ABILIFY) 20 MG tablet Take 20 mg by mouth daily   Yes Historical Provider, MD   venlafaxine (EFFEXOR) 100 MG tablet Take 300 mg by mouth every morning (before breakfast)   Yes Historical Provider, MD   benztropine (COGENTIN) 0.5 MG tablet Take 0.5 mg by mouth daily   Yes Historical Provider, MD   zolpidem (AMBIEN) 10 MG tablet Take 40 mg by mouth nightly as needed for Sleep. Yes Historical Provider, MD   rizatriptan (MAXALT) 10 MG tablet Take 10 mg by mouth once as needed for Migraine May repeat in 2 hours if needed   Yes Historical Provider, MD   prazosin (MINIPRESS) 2 MG capsule Take 1 capsule by mouth nightly 9/21/15  Yes Kristine Adame MD   levothyroxine (SYNTHROID) 88 MCG tablet Take 88 mcg by mouth Daily   Yes Historical Provider, MD   clonazePAM (KLONOPIN) 1 MG tablet Take 1 mg by mouth 2 times daily. Natacha Garibay     Historical Provider, MD   ibuprofen (ADVIL;MOTRIN) 600 MG tablet Take 1 tablet by mouth every 6 hours as needed for Pain 6/10/18   Alyssa Torres MD     SOCIAL HISTORY:       FAMILY HISTORY:          Problem Relation Age of Onset    COPD Sister     Diabetes Sister     Cancer Mother      REVIEW OF SYSTEMS (ROS):   Unable to obtained due to patient condition     OBJECTIVE:     VITAL SIGNS:  /68   Pulse 75   Temp 98.5 °F (36.9 °C) (Oral)   Resp 14   Ht 4' 10\" (1.473 m)   Wt 200 lb (90.7 kg)   LMP 2020   SpO2 97%   BMI 41.80 kg/m²   Tmax over 24 hours:  Temp (24hrs), Av.5 °F (36.9 °C), Min:98.5 °F (36.9 °C), Max:98.5 °F (36.9 °C)      Patient Magnesium:   Lab Results   Component Value Date    MG 2.0 09/14/2020    MG 2.0 09/04/2016     Urinalysis:   Lab Results   Component Value Date    NITRU NEGATIVE 09/17/2015    COLORU YELLOW 09/17/2015    PHUR 5.0 09/17/2015    WBCUA None 09/17/2015    RBCUA None 09/17/2015    MUCUS NOT REPORTED 09/17/2015    TRICHOMONAS NOT REPORTED 09/17/2015    YEAST NOT REPORTED 09/17/2015    BACTERIA NOT REPORTED 09/17/2015    SPECGRAV 1.026 09/17/2015    LEUKOCYTESUR NEGATIVE 09/17/2015    UROBILINOGEN Normal 09/17/2015    BILIRUBINUR NEGATIVE 09/17/2015    GLUCOSEU NEGATIVE 09/17/2015    KETUA SMALL 09/17/2015    AMORPHOUS 4+ 09/17/2015     TSH:    Lab Results   Component Value Date    TSH 0.99 09/14/2020     Radiological imaging  Xr Chest Portable    Result Date: 9/14/2020  EXAMINATION: ONE XRAY VIEW OF THE CHEST 9/14/2020 12:07 pm COMPARISON: None. HISTORY: ORDERING SYSTEM PROVIDED HISTORY: overdose TECHNOLOGIST PROVIDED HISTORY: overdose Reason for Exam: overdose Acuity: Unknown FINDINGS: Lordotic positioning and slight rotation. Mild cardiac silhouette enlargement. No focal airspace disease, evidence for pneumothorax or effusion. No acute osseous abnormality identified. No acute airspace disease identified. ASSESSMENT:     Active Problems:    Severe episode of recurrent major depressive disorder, without psychotic features ([facility]    Hypothyroidism    Suicide attempt by drug ingestion ([facility]    Suicide attempt [facility])  Resolved Problems:    * No resolved hospital problems.  *      PLAN:     · Acute suicidal attempt with intentional drug overdose (multipe anti psych med's)  Continue IV fluids at 125 mL/h  Continue to monitor vitals  As patient took Minipress, which could be the reason for her normal BP  Suicide precaution  Seizure precaution   Will consult SW   Will consult psychiatry             Beth Ortiz MD.  Internal Medicine Resident  [address]   9/14/2020, 4:07 PM  Attending Physician Statement  I have discussed the care of Linda Nguyen, including pertinent history and exam findings,  with the resident. I have seen and examined the patient and the key elements of all parts of the encounter have been performed by me. I agree with the assessment, plan and orders as documented by the resident with additions . Treatment plan Discussed with nursing staff in detail , all questions answered . Electronically signed by Briseyda Vargas MD on   9/14/20 at 6:44 PM EDT    Please note that this chart was generated using voice recognition Dragon dictation software. Although every effort was made to ensure the accuracy of this automated transcription, some errors in transcription may have occurred.

## 2020-09-14 NOTE — CARE COORDINATION
Case Management Initial Discharge Plan  Yolis Cheng,             Met with:spouse/SO to discuss discharge plans. Information verified: address, contacts, phone number, , insurance Yes,  states [facility]    Emergency Contact/Next of Kin name & number:  Itzel Roe    PCP: Rosa Porras MD  Date of last visit: 1-2 months    Insurance Provider: [employer]    Discharge Planning    Living Arrangements:    lives with  and son  Support Systems:       Home has 1 stories  3 stairs to climb to get into front door, no stairs to climb to reach second floor  Location of bedroom/bathroom in home main    Patient able to perform ADL's:Independent    Current Services (outpatient & in home) none  DME equipment: none  DME provider: none    Receiving oral anticoagulation therapy? No    If indicated:   Physician managing anticoagulation treatment: none  Where does patient obtain lab work for ATC treatment? na      Potential Assistance Needed:       Patient agreeable to home care: No  Dille of choice provided:  n/a    Prior SNF/Rehab Placement and Facility: none  Agreeable to SNF/Rehab: No  Dille of choice provided: n/a     Evaluation: n/a    Expected Discharge date:       Patient expects to be discharged to: Follow Up Appointment: Best Day/ Time:      Transportation provider:   Transportation arrangements needed for discharge: No    Readmission Risk              Risk of Unplanned Readmission:        8             Does patient have a readmission risk score greater than 14?: No  If yes, follow-up appointment must be made within 7 days of discharge.      Goals of Care: comfort care      Discharge Plan: plan is home with  vs [facility], psychiatry and SW following          Electronically signed by Alexey Campos RN on 20 at 6:08 PM EDT

## 2020-09-15 VITALS
TEMPERATURE: 98 F | OXYGEN SATURATION: 95 % | HEART RATE: 55 BPM | BODY MASS INDEX: 49.33 KG/M2 | DIASTOLIC BLOOD PRESSURE: 61 MMHG | RESPIRATION RATE: 16 BRPM | HEIGHT: 58 IN | WEIGHT: 235.01 LBS | SYSTOLIC BLOOD PRESSURE: 109 MMHG

## 2020-09-15 LAB
-: ABNORMAL
ABSOLUTE EOS #: 0.19 K/UL (ref 0–0.44)
ABSOLUTE IMMATURE GRANULOCYTE: 0.05 K/UL (ref 0–0.3)
ABSOLUTE LYMPH #: 3.44 K/UL (ref 1.1–3.7)
ABSOLUTE MONO #: 0.52 K/UL (ref 0.1–1.2)
ALBUMIN SERPL-MCNC: 3.2 G/DL (ref 3.5–5.2)
ALBUMIN/GLOBULIN RATIO: 1.1 (ref 1–2.5)
ALP BLD-CCNC: 58 U/L (ref 35–104)
ALT SERPL-CCNC: 8 U/L (ref 5–33)
AMORPHOUS: ABNORMAL
ANION GAP SERPL CALCULATED.3IONS-SCNC: 7 MMOL/L (ref 9–17)
AST SERPL-CCNC: 12 U/L
BACTERIA: ABNORMAL
BASOPHILS # BLD: 1 % (ref 0–2)
BASOPHILS ABSOLUTE: 0.07 K/UL (ref 0–0.2)
BILIRUB SERPL-MCNC: 0.28 MG/DL (ref 0.3–1.2)
BILIRUBIN URINE: NEGATIVE
BUN BLDV-MCNC: 10 MG/DL (ref 6–20)
BUN/CREAT BLD: ABNORMAL (ref 9–20)
CALCIUM SERPL-MCNC: 8.1 MG/DL (ref 8.6–10.4)
CASTS UA: ABNORMAL /LPF (ref 0–8)
CHLORIDE BLD-SCNC: 113 MMOL/L (ref 98–107)
CO2: 22 MMOL/L (ref 20–31)
COLOR: YELLOW
COMMENT UA: ABNORMAL
CREAT SERPL-MCNC: 0.93 MG/DL (ref 0.5–0.9)
CRYSTALS, UA: ABNORMAL /HPF
DIFFERENTIAL TYPE: ABNORMAL
DIRECT EXAM: NORMAL
EKG ATRIAL RATE: 86 BPM
EKG P AXIS: 34 DEGREES
EKG P-R INTERVAL: 144 MS
EKG Q-T INTERVAL: 362 MS
EKG QRS DURATION: 80 MS
EKG QTC CALCULATION (BAZETT): 433 MS
EKG R AXIS: 10 DEGREES
EKG T AXIS: 4 DEGREES
EKG VENTRICULAR RATE: 86 BPM
EOSINOPHILS RELATIVE PERCENT: 2 % (ref 1–4)
EPITHELIAL CELLS UA: ABNORMAL /HPF (ref 0–5)
GFR AFRICAN AMERICAN: >60 ML/MIN
GFR NON-AFRICAN AMERICAN: >60 ML/MIN
GFR SERPL CREATININE-BSD FRML MDRD: ABNORMAL ML/MIN/{1.73_M2}
GFR SERPL CREATININE-BSD FRML MDRD: ABNORMAL ML/MIN/{1.73_M2}
GLUCOSE BLD-MCNC: 88 MG/DL (ref 70–99)
GLUCOSE URINE: NEGATIVE
HCT VFR BLD CALC: 34.1 % (ref 36.3–47.1)
HEMOGLOBIN: 10.7 G/DL (ref 11.9–15.1)
IMMATURE GRANULOCYTES: 1 %
KETONES, URINE: NEGATIVE
LEUKOCYTE ESTERASE, URINE: ABNORMAL
LYMPHOCYTES # BLD: 40 % (ref 24–43)
Lab: NORMAL
MCH RBC QN AUTO: 28 PG (ref 25.2–33.5)
MCHC RBC AUTO-ENTMCNC: 31.4 G/DL (ref 28.4–34.8)
MCV RBC AUTO: 89.3 FL (ref 82.6–102.9)
MONOCYTES # BLD: 6 % (ref 3–12)
MUCUS: ABNORMAL
NITRITE, URINE: NEGATIVE
NRBC AUTOMATED: 0 PER 100 WBC
OTHER OBSERVATIONS UA: ABNORMAL
PDW BLD-RTO: 14.4 % (ref 11.8–14.4)
PH UA: 5.5 (ref 5–8)
PLATELET # BLD: 317 K/UL (ref 138–453)
PLATELET ESTIMATE: ABNORMAL
PMV BLD AUTO: 10 FL (ref 8.1–13.5)
POTASSIUM SERPL-SCNC: 4.4 MMOL/L (ref 3.7–5.3)
PROTEIN UA: NEGATIVE
RBC # BLD: 3.82 M/UL (ref 3.95–5.11)
RBC # BLD: ABNORMAL 10*6/UL
RBC UA: ABNORMAL /HPF (ref 0–4)
RENAL EPITHELIAL, UA: ABNORMAL /HPF
SARS-COV-2, RAPID: NOT DETECTED
SARS-COV-2: NORMAL
SARS-COV-2: NORMAL
SEG NEUTROPHILS: 50 % (ref 36–65)
SEGMENTED NEUTROPHILS ABSOLUTE COUNT: 4.36 K/UL (ref 1.5–8.1)
SODIUM BLD-SCNC: 142 MMOL/L (ref 135–144)
SOURCE: NORMAL
SPECIFIC GRAVITY UA: 1.01 (ref 1–1.03)
SPECIMEN DESCRIPTION: NORMAL
TOTAL PROTEIN: 6 G/DL (ref 6.4–8.3)
TRICHOMONAS: ABNORMAL
TURBIDITY: CLEAR
URINE HGB: ABNORMAL
UROBILINOGEN, URINE: NORMAL
WBC # BLD: 8.6 K/UL (ref 3.5–11.3)
WBC # BLD: ABNORMAL 10*3/UL
WBC UA: ABNORMAL /HPF (ref 0–5)
YEAST: ABNORMAL

## 2020-09-15 PROCEDURE — 85025 COMPLETE CBC W/AUTO DIFF WBC: CPT

## 2020-09-15 PROCEDURE — 80053 COMPREHEN METABOLIC PANEL: CPT

## 2020-09-15 PROCEDURE — 2580000003 HC RX 258: Performed by: STUDENT IN AN ORGANIZED HEALTH CARE EDUCATION/TRAINING PROGRAM

## 2020-09-15 PROCEDURE — 99233 SBSQ HOSP IP/OBS HIGH 50: CPT | Performed by: INTERNAL MEDICINE

## 2020-09-15 PROCEDURE — 6360000002 HC RX W HCPCS: Performed by: STUDENT IN AN ORGANIZED HEALTH CARE EDUCATION/TRAINING PROGRAM

## 2020-09-15 PROCEDURE — 36415 COLL VENOUS BLD VENIPUNCTURE: CPT

## 2020-09-15 PROCEDURE — 93010 ELECTROCARDIOGRAM REPORT: CPT | Performed by: INTERNAL MEDICINE

## 2020-09-15 PROCEDURE — 81001 URINALYSIS AUTO W/SCOPE: CPT

## 2020-09-15 PROCEDURE — U0002 COVID-19 LAB TEST NON-CDC: HCPCS

## 2020-09-15 PROCEDURE — 1200000000 HC SEMI PRIVATE

## 2020-09-15 PROCEDURE — 94761 N-INVAS EAR/PLS OXIMETRY MLT: CPT

## 2020-09-15 PROCEDURE — 6370000000 HC RX 637 (ALT 250 FOR IP): Performed by: STUDENT IN AN ORGANIZED HEALTH CARE EDUCATION/TRAINING PROGRAM

## 2020-09-15 PROCEDURE — 99222 1ST HOSP IP/OBS MODERATE 55: CPT | Performed by: PSYCHIATRY & NEUROLOGY

## 2020-09-15 RX ORDER — CLONAZEPAM 1 MG/1
1 TABLET ORAL 2 TIMES DAILY
Status: DISCONTINUED | OUTPATIENT
Start: 2020-09-15 | End: 2020-09-16 | Stop reason: HOSPADM

## 2020-09-15 RX ORDER — PRAZOSIN HYDROCHLORIDE 1 MG/1
2 CAPSULE ORAL NIGHTLY
Status: DISCONTINUED | OUTPATIENT
Start: 2020-09-15 | End: 2020-09-16 | Stop reason: HOSPADM

## 2020-09-15 RX ORDER — ZOLPIDEM TARTRATE 5 MG/1
40 TABLET ORAL NIGHTLY PRN
Status: DISCONTINUED | OUTPATIENT
Start: 2020-09-16 | End: 2020-09-15

## 2020-09-15 RX ORDER — ESCITALOPRAM OXALATE 10 MG/1
20 TABLET ORAL DAILY
Status: DISCONTINUED | OUTPATIENT
Start: 2020-09-16 | End: 2020-09-16 | Stop reason: HOSPADM

## 2020-09-15 RX ORDER — MIRTAZAPINE 30 MG/1
30 TABLET, FILM COATED ORAL NIGHTLY
Status: DISCONTINUED | OUTPATIENT
Start: 2020-09-15 | End: 2020-09-16 | Stop reason: HOSPADM

## 2020-09-15 RX ORDER — BENZTROPINE MESYLATE 0.5 MG/1
0.5 TABLET ORAL DAILY
Status: DISCONTINUED | OUTPATIENT
Start: 2020-09-16 | End: 2020-09-16 | Stop reason: HOSPADM

## 2020-09-15 RX ORDER — ZOLPIDEM TARTRATE 5 MG/1
20 TABLET ORAL NIGHTLY PRN
Status: DISCONTINUED | OUTPATIENT
Start: 2020-09-16 | End: 2020-09-16 | Stop reason: HOSPADM

## 2020-09-15 RX ORDER — ESCITALOPRAM OXALATE 20 MG/1
20 TABLET ORAL DAILY
COMMUNITY

## 2020-09-15 RX ORDER — LEVOTHYROXINE SODIUM 88 UG/1
88 TABLET ORAL DAILY
Status: DISCONTINUED | OUTPATIENT
Start: 2020-09-16 | End: 2020-09-16 | Stop reason: HOSPADM

## 2020-09-15 RX ORDER — ARIPIPRAZOLE 10 MG/1
20 TABLET ORAL DAILY
Status: DISCONTINUED | OUTPATIENT
Start: 2020-09-16 | End: 2020-09-16 | Stop reason: HOSPADM

## 2020-09-15 RX ADMIN — PRAZOSIN HYDROCHLORIDE 2 MG: 1 CAPSULE ORAL at 23:36

## 2020-09-15 RX ADMIN — Medication 10 ML: at 20:09

## 2020-09-15 RX ADMIN — MIRTAZAPINE 30 MG: 30 TABLET, FILM COATED ORAL at 23:35

## 2020-09-15 RX ADMIN — SODIUM CHLORIDE: 9 INJECTION, SOLUTION INTRAVENOUS at 09:53

## 2020-09-15 RX ADMIN — SODIUM CHLORIDE 1000 ML: 9 INJECTION, SOLUTION INTRAVENOUS at 01:52

## 2020-09-15 RX ADMIN — ACETAMINOPHEN 650 MG: 325 TABLET ORAL at 17:27

## 2020-09-15 RX ADMIN — ENOXAPARIN SODIUM 40 MG: 40 INJECTION SUBCUTANEOUS at 08:08

## 2020-09-15 RX ADMIN — CLONAZEPAM 1 MG: 1 TABLET ORAL at 23:35

## 2020-09-15 ASSESSMENT — PAIN SCALES - GENERAL
PAINLEVEL_OUTOF10: 4
PAINLEVEL_OUTOF10: 7

## 2020-09-15 ASSESSMENT — PAIN DESCRIPTION - LOCATION: LOCATION: HEAD

## 2020-09-15 NOTE — PLAN OF CARE
Problem: Pain:  Goal: Pain level will decrease  Description: Pain level will decrease  Outcome: Ongoing  Goal: Control of acute pain  Description: Control of acute pain  Outcome: Ongoing  Goal: Control of chronic pain  Description: Control of chronic pain  Outcome: Ongoing     Problem: Anxiety/Stress:  Goal: Level of anxiety will decrease  Description: Level of anxiety will decrease  Outcome: Ongoing     Problem: Aspiration:  Goal: Absence of aspiration  Description: Absence of aspiration  Outcome: Ongoing     Problem: Mental Status - Impaired:  Goal: Mental status will be restored to baseline  Description: Mental status will be restored to baseline  Outcome: Ongoing     Problem: Sleep Pattern Disturbance:  Goal: Appears well-rested  Description: Appears well-rested  Outcome: Ongoing     Problem: Skin Integrity:  Goal: Will show no infection signs and symptoms  Description: Will show no infection signs and symptoms  Outcome: Ongoing  Goal: Absence of new skin breakdown  Description: Absence of new skin breakdown  Outcome: Ongoing     Problem: Falls - Risk of:  Goal: Will remain free from falls  Description: Will remain free from falls  Outcome: Ongoing     Problem: Suicide risk  Goal: Provide patient with safe environment  Description: Provide patient with safe environment  Outcome: Ongoing

## 2020-09-15 NOTE — CARE COORDINATION
Care Transition  Met with patient and her choice for inpatient psych is [facility]. Called Patient Access to start process. Asked Dr. Betsy Cronin to complete pink slip, order a UA and a rapid covid test.     1510 Received call from patient access they will need it documented in the chart that patient is medically cleared for discharge. 330 Robby Guillory. floor spoke with Samantha Dailey and she will ask Dr. Betsy Cronin to place in chart patient is medically stable. 5 Called Patient access her covid is negative, UA completed, and Dr Jhon Noel wrote in his note she is medically cleared. She will follow up with [facility] psych unit and see if they have an opening.

## 2020-09-15 NOTE — PROGRESS NOTES
Critical care team - Resident sign-out to medicine service      Date and time: 9/15/2020 7:10 PM  Patient's name:  Steve Leal  Medical Record Number: [id number]  Patient's account/billing number: [de-identified]  Patient's YOB: 1975  Age: 39 y.o. Date of Admission: 9/14/2020 11:46 AM  Length of stay during current admission: 1    Primary Care Physician: Karyna Mckenzie MD    Code Status: Full Code    Mode of physician to physician communication:        [] Via telephone   [] In person     Date and time of sign-out: 9/15/2020 7:10 PM    Accepting Internal Medicine resident: Dr. Michelle mc     Accepting Medicine team: IM Team 2    Accepting team's attending: Dr. Jacque Matson     Patient's current ICU Bed: 125    Patient's assigned bed on floor:  330        [x] Med-Surg Monitored [] Step-down       [] Psychiatry ICU       [] Psych floor     Reason for ICU admission:   Suicidal attempt with drug overdose     ICU course summary:   Steve Leal is a 39 y.o. with history of suicide attempt in the past, know case of severe depression on multiple anti-psych medication, hypothyroidism. She was sent to the ED from her psychiatrist office. As per reports she took 50 tablets of 10 mg Ambien, 12 tablets of 2 mg Minipress, 3 tablets of 20 mg Abilify and 3 tablets of 0.5 mg Cogentin. On arrival, patient was drowsy with a slurry speech but responding to questions appropriately. Patient was vitally hemodynamically stable. BMP unremarkable except for creatinine 1.01 and bicarbonate 19. CBC and LFTs unremarkable. Tropes within normal limits. Chest x-ray and EKG unremarkable. Urine Tox negative   Poison control were notified and recommended to monitor in the ICU. Patient was given a 1 L normal saline IV bolus and later started on IV maintenance fluid. Creatinine normalized. Stopped Iv fluid and started diet.    Psychiatry consulted, had tele psych evaluation today and recommended inpatient psychiatry 9/15/2020, 7:11 PM

## 2020-09-15 NOTE — FLOWSHEET NOTE
A: Patient seemed approachable. When questioned about reason for hospital stay, patient was tearful and expressed feelings of failure, spiritual struggle, and feeling overwhelmed. Patient expressed anxiety about upcoming meeting with Psychiatrist at 1 pm.    I:  provided sustaining presence and active listening.  explored feelings, thoughts, and concerns with the patient, including discussing the impact patient's entrance into hospital is having on her life and family.  provided empathy and prayed with patient. O: Patient expressed her feelings and concerns. Patient expressed gratitude for the visit and seed comforted. Patient seemed less anxious/agitated when  left patient's room. 09/15/20 1240   Encounter Summary   Services provided to: Patient   Support System Spouse; Children; Therapist   [facility]   Contact Protestant No   Continue Visiting   (9/15/2020)   Complexity of Encounter Moderate   Length of Encounter 15 minutes   Spiritual Assessment Completed Yes   Routine   Type Initial   Assessment Tearful; Approachable; Anxious; Spiritual struggle   Intervention Active listening;Explored feelings, thoughts, concerns;Prayer;Sustaining presence/ Ministry of presence; Discussed illness/injury and it's impact   Outcome Comfort;Expressed gratitude;Expressed feelings/needs/concerns; Less anxious, less agitated     Electronically signed by Shady Brown on 9/15/2020 at 1:20 PM
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
## 2020-09-15 NOTE — PROGRESS NOTES
Critical Care Team - Daily Progress Note      Date and time: 9/15/2020 8:43 AM  Patient's name:  Renetta Cartwright  Medical Record Number: [id number]  Patient's account/billing number: [de-identified]  Patient's YOB: 1975  Age: 39 y.o. Date of Admission: 9/14/2020 11:46 AM  Length of stay during current admission: 1      Primary Care Physician: Olivia Payton MD  ICU Attending Physician: Dr. Karen Macdonald Status: Full Code    Reason for ICU admission:   Chief Complaint   Patient presents with    Drug Overdose     intentional to harm self. Subjective:     OVERNIGHT EVENTS:         Patient seen and examined at bedside. No acute events overnight. Patient's map was in the 50s overnight so she received 1 unit of bolus. Blood pressure this morning 117/64. Diet was started, IV fluids at 125 mL an hour  Creatinine improving from 1.01-> 0.93  Patient denies any suicidal or homicidal ideations currently. Patient very tearful when questioned about who she lives with at home. Patient states that she lives with her  and son however is very emotional when talking about it. [employer] B.I.D.  Feeding Diet: DIET GENERAL;   Fluids: IVF at 125 mils an hour   Family: Updated   Analgesic: Tylenol as needed   Sedation: None   Thrombo-prophylaxis: [x] Enoxaparin, [] Unfract. Heparin Subcutaneously, [] EPC Cuffs   Mobility: Up as tolerated   Heads up: 30 degrees   Ulcer prophylaxis: [] PPI Agent, [] [id number], [] Sucralfate, [] Other: Not indicated   Glycemic control: Adequate   Spontaneous breathing trial: None   Bowel regimen/urine output: Adequate output. GlycoLax.    Indwelling catheter/lines: Peripheral lines   De-escalation: None    AWAKE & FOLLOWING COMMANDS:  [] No   [x] Yes    CURRENT VENTILATION STATUS:     [] Ventilator  [] BIPAP  [] Nasal Cannula [x] Room Air      IF INTUBATED, ET TUBE MARKING AT LOWER LIP:       cms    SECRETIONS Amount:  [] Small [] Moderate  [] Meds:   sodium chloride flush  10 mL Intravenous 2 times per day    enoxaparin  40 mg Subcutaneous Daily     Continuous Infusions:   sodium chloride 1,000 mL (09/15/20 0152)     PRN Meds:   sodium chloride flush, 10 mL, PRN  acetaminophen, 650 mg, Q6H PRN    Or  acetaminophen, 650 mg, Q6H PRN  polyethylene glycol, 17 g, Daily PRN  promethazine, 12.5 mg, Q6H PRN    Or  ondansetron, 4 mg, Q6H PRN  potassium chloride, 40 mEq, PRN    Or  potassium alternative oral replacement, 40 mEq, PRN    Or  potassium chloride, 10 mEq, PRN        SUPPORT DEVICES: [] Ventilator [] BIPAP  [] Nasal Cannula [x] Room Air    VENT SETTINGS (Comprehensive) (if applicable):  Vent Information  SpO2: 97 %  Additional Respiratory  Assessments  Pulse: 86  Resp: 16  SpO2: 97 %    ABGs:     No results found for: PHART, PH, BIT2DDG, PCO2, PO2ART, PO2, SPA6CRL, HCO3, BEART, BE, THGBART, THB, QHV0LXB, E2BJMZBK, O2SAT, FIO2  Lactic Acid: No results found for: LACTA      DATA:  Complete Blood Count:   Recent Labs     09/14/20  1157 09/15/20  0609   WBC 8.1 8.6   HGB 12.8 10.7*   MCV 87.7 89.3    317   RBC 4.65 3.82*   HCT 40.8 34.1*   MCH 27.5 28.0   MCHC 31.4 31.4   RDW 14.1 14.4   MPV 10.0 10.0        PT/INR:    Lab Results   Component Value Date    PROTIME 10.7 09/05/2016    INR 1.0 09/05/2016     PTT:  No results found for: APTT, PTT    Basal Metabolic Profile:   Recent Labs     09/14/20  1157 09/15/20  0609    142   K 4.6 4.4   BUN 14 10   CREATININE 1.01* 0.93*    113*   CO2 19* 22      Magnesium:   Lab Results   Component Value Date    MG 2.0 09/14/2020    MG 2.0 09/04/2016     Phosphorus: No results found for: PHOS  S. Calcium:  Recent Labs     09/15/20  0609   CALCIUM 8.1*     S. Ionized Calcium:No results for input(s): IONCA in the last 72 hours.       Urinalysis:   Lab Results   Component Value Date    NITRU NEGATIVE 09/17/2015    COLORU YELLOW 09/17/2015    PHUR 5.0 09/17/2015    WBCUA None 09/17/2015    RBCUA None 09/17/2015    MUCUS NOT REPORTED 09/17/2015    TRICHOMONAS NOT REPORTED 09/17/2015    YEAST NOT REPORTED 09/17/2015    BACTERIA NOT REPORTED 09/17/2015    SPECGRAV 1.026 09/17/2015    LEUKOCYTESUR NEGATIVE 09/17/2015    UROBILINOGEN Normal 09/17/2015    BILIRUBINUR NEGATIVE 09/17/2015    GLUCOSEU NEGATIVE 09/17/2015    ROCKY SMALL 09/17/2015    AMORPHOUS 4+ 09/17/2015       CARDIAC ENZYMES: No results for input(s): CKMB, CKMBINDEX, TROPONINI in the last 72 hours. Invalid input(s): CKTOTAL;3  BNP: No results for input(s): BNP in the last 72 hours. LFTS  Recent Labs     09/14/20  1157 09/15/20  0609   ALKPHOS 70 58   ALT 11 8   AST 23 12   BILITOT 0.23* 0.28*   LABALBU 3.6 3.2*       AMYLASE/LIPASE/AMMONIA  No results for input(s): AMYLASE, LIPASE, AMMONIA in the last 72 hours. Last 3 Blood Glucose:   Recent Labs     09/14/20  1157 09/15/20  0609   GLUCOSE 132* 88      HgBA1c:  No results found for: LABA1C      TSH:    Lab Results   Component Value Date    TSH 0.99 09/14/2020     ANEMIA STUDIES  No results for input(s): LABIRON, TIBC, FERRITIN, NRERYDPV13, FOLATE, OCCULTBLD in the last 72 hours. Cultures during this admission:     Blood cultures:                 [x] None drawn      [] Negative             []  Positive (Details:  )  Urine Culture:                   [x] None drawn      [] Negative             []  Positive (Details:  )  Sputum Culture:               [x] None drawn       [] Negative             []  Positive (Details:  )   Endotracheal aspirate:     [x] None drawn       [] Negative             []  Positive (Details:  )        ASSESSMENT:     Active Problems:    Severe episode of recurrent major depressive disorder, without psychotic features ([facility] [address]    Hypothyroidism    Suicide attempt by drug ingestion ([facility] [address]    Suicide attempt [facility])  Resolved Problems:    * No resolved hospital problems.  *        PLAN:     Acute suicidal attempt with intentional drug overdose (multipe anti psych med's)  Continue IV fluids at 125 mL/h  Continue to monitor vitals  Suicide precaution  Seizure precaution   Sitter at bedside   Pending psych consult   [address] Ammie Kehr, M.D. Department of Internal Medicine/ Critical care  [facility])             9/15/2020, 8:43 AM    Attending Physician Statement  I have discussed the care of Guillermo Meier, including pertinent history and exam findings,  with the resident. I have seen and examined the patient and the key elements of all parts of the encounter have been performed by me. I agree with the assessment, plan and orders as documented by the resident with additions . Medically cleared for [facility]   Treatment plan Discussed with nursing staff in detail , all questions answered . Electronically signed by Radha Davidson MD on   9/15/20 at 3:16 PM EDT    Please note that this chart was generated using voice recognition Dragon dictation software. Although every effort was made to ensure the accuracy of this automated transcription, some errors in transcription may have occurred.

## 2020-09-15 NOTE — CARE COORDINATION
Met with pt after receiving a social work consult due to a suicide attempt. Pt was alert and oriented. She states that she is feeling better today because she is here, but she feels very overwhelmed at home. She states that she and her spouse bought a house is Feb and everything is breaking down. She states that they cannot pay the mortgage because of all the needed repairs. Her daughter turned   25 in August and moved out the next day, which makes her very sad. She has a 21year old son that lives at home and is special needs. She states that she works at [employer] and is working from home, which is more stressful. Pt states that she has been a pt of Dr. [facility] Patten psychiatric practice for 10 years. She is also in counseling with FARTUN Holt and has been for a number of years. Pt stated that Alexandra Headley is retiring soon and became very tearful discussing this change. She stated that Alexandra Headley is working with pt to find a counselor for her. Pt is agreeable to a psyche admission but does not want to go to [facility] as she works with someone that also works at [facility]. Pt's preference would be to go to [facility] psyche unit. Pt states that she has [employer]. SW will follow.

## 2020-09-16 ASSESSMENT — PAIN SCALES - GENERAL: PAINLEVEL_OUTOF10: 2

## 2020-09-16 ASSESSMENT — PAIN DESCRIPTION - PAIN TYPE: TYPE: ACUTE PAIN

## 2020-09-16 ASSESSMENT — PAIN DESCRIPTION - LOCATION: LOCATION: HEAD

## 2020-09-16 NOTE — PLAN OF CARE
Problem: Pain:  Goal: Pain level will decrease  Description: Pain level will decrease  9/16/2020 0253 by Luz Mooney RN  Outcome: Completed  9/16/2020 0105 by Luz Mooney RN  Outcome: Ongoing  9/15/2020 1806 by Rhea Rios RN  Outcome: Ongoing  Goal: Control of acute pain  Description: Control of acute pain  9/16/2020 0253 by Luz Mooney RN  Outcome: Completed  9/16/2020 0105 by Lzu Mooney RN  Outcome: Ongoing  9/15/2020 2004 by Sharad Mcintosh RN  Outcome: Ongoing  Goal: Control of chronic pain  Description: Control of chronic pain  9/16/2020 0253 by Luz Mooney RN  Outcome: Completed  9/16/2020 0105 by Luz Mooney RN  Outcome: Ongoing     Problem: Anxiety/Stress:  Goal: Level of anxiety will decrease  Description: Level of anxiety will decrease  9/16/2020 0253 by Luz Mooney RN  Outcome: Completed  9/16/2020 0105 by Luz Mooney RN  Outcome: Ongoing  9/15/2020 1806 by Rhea Rios RN  Outcome: Ongoing     Problem: Aspiration:  Goal: Absence of aspiration  Description: Absence of aspiration  9/16/2020 0253 by Luz Mooney RN  Outcome: Completed  9/16/2020 0105 by Luz Mooney RN  Outcome: Ongoing     Problem: Mental Status - Impaired:  Goal: Mental status will be restored to baseline  Description: Mental status will be restored to baseline  9/16/2020 0253 by Luz Mooney RN  Outcome: Completed  9/16/2020 0105 by Luz Mooney RN  Outcome: Ongoing  9/15/2020 1806 by Rhea Riso RN  Outcome: Ongoing     Problem: Sleep Pattern Disturbance:  Goal: Appears well-rested  Description: Appears well-rested  9/16/2020 0253 by Luz Mooney RN  Outcome: Completed  9/16/2020 0105 by Luz Mooney RN  Outcome: Ongoing  9/15/2020 1806 by Rhea Rios RN  Outcome: Ongoing     Problem: Skin Integrity:  Goal: Will show no infection signs and symptoms  Description: Will show no infection signs and symptoms  9/16/2020 0253 by Luz Mooney RN  Outcome: Completed  9/16/2020 0105 by Barrett Butts RN  Outcome: Ongoing  9/15/2020 1806 by Wilbert Desai RN  Outcome: Ongoing  Goal: Absence of new skin breakdown  Description: Absence of new skin breakdown  9/16/2020 0253 by Barrett Butts RN  Outcome: Completed  9/16/2020 0105 by Barrett Butts RN  Outcome: Ongoing     Problem: Falls - Risk of:  Goal: Will remain free from falls  Description: Will remain free from falls  9/16/2020 0253 by Barrett Butts RN  Outcome: Completed  9/16/2020 0105 by Barrett Butts RN  Outcome: Ongoing  9/15/2020 1806 by Wilbert Desai RN  Outcome: Ongoing  Goal: Absence of physical injury  Description: Absence of physical injury  9/16/2020 0253 by Barrett Butts RN  Outcome: Completed  9/16/2020 0105 by Barrett Butts RN  Outcome: Ongoing     Problem: Suicide risk  Goal: Provide patient with safe environment  Description: Provide patient with safe environment  9/16/2020 0253 by Barrett Butts RN  Outcome: Completed  9/16/2020 0105 by Barrett Butts RN  Outcome: Ongoing  9/15/2020 1806 by Wilbert Desai RN  Outcome: Ongoing

## 2020-09-16 NOTE — PLAN OF CARE
Problem: Pain:  Goal: Pain level will decrease  Description: Pain level will decrease  9/15/2020 1806 by Mary Flores RN  Outcome: Ongoing  Goal: Control of acute pain  Description: Control of acute pain  Outcome: Ongoing

## 2020-09-16 NOTE — PLAN OF CARE
Problem: Anxiety/Stress:  Goal: Level of anxiety will decrease  Description: Level of anxiety will decrease  9/16/2020 0105 by Luz Mooney RN  Outcome: Ongoing  9/15/2020 1806 by Rhea Rios RN  Outcome: Ongoing     Problem: Mental Status - Impaired:  Goal: Mental status will be restored to baseline  Description: Mental status will be restored to baseline  9/16/2020 0105 by Luz Mooney RN  Outcome: Ongoing  9/15/2020 1806 by Rhea Rios RN  Outcome: Ongoing     Problem: Skin Integrity:  Goal: Will show no infection signs and symptoms  Description: Will show no infection signs and symptoms  9/16/2020 0105 by Luz Mooney RN  Outcome: Ongoing  9/15/2020 1806 by Rhea Rios RN  Outcome: Ongoing  Goal: Absence of new skin breakdown  Description: Absence of new skin breakdown  Outcome: Ongoing     Problem: Suicide risk  Goal: Provide patient with safe environment  Description: Provide patient with safe environment  9/16/2020 0105 by Luz Mooney RN  Outcome: Ongoing  9/15/2020 1806 by Rhea Rios RN  Outcome: Ongoing

## 2020-09-16 NOTE — PROGRESS NOTES
INTERNAL MEDICINE                                                                             ICU PATIENT TRANSFER NOTE        Patient:  Latrice Garcia  YOB: 1975  MRN: [id number]     Acct: [de-identified]     Admit date: 9/14/2020    Code Status:-  Full code     Reason for ICU Admission:-       SUPPORT DEVICES: [] Ventilator [] BIPAP  [] Nasal Cannula [x] Room Air    Consultations:- [] Cardiology [] Nephrology  [] Hemo onco  [] GI                               [] ID [] ENT  [] Rheum [] Endo   []Physiotherapy                                 Others:-     NUTRITION:  [] NPO [] Tube Feeding (Specify: ) [] TPN  [x] PO    Central Lines:- [x] No   [] Yes           If yes - Days/Date of Insertion. Pt seen and Chart reviewed. She just got transferred to the floor from the ICU where she was managed for drug overdose. She was seen in the room in the presence of sitter and her . She reports feeling fine, denies any active complaint at the moment. Denies chest pain, shortness of breath, abdominal pain or dizziness. She is alert and oriented, lying comfortably on the bed. Her examination is benign. Her heart rate is 55, stable other vitals. ICU COURSE :-   Aurelio Phan a 39 y. o. with history of suicide attempt in the past, know case of severe depression on multiple anti-psych medication, hypothyroidism. She was sent to the ED from her psychiatrist office. Sebas Gray per reports she took 50 tablets of 10 mg Ambien, 12 tablets of 2 mg Minipress, 3 tablets of 20 mg Abilify and 3 tablets of 0.5 mg Cogentin. On arrival, patient was drowsy with a slurry speech but responding to questions appropriately. Patient was vitally hemodynamically stable. BMP unremarkable except for creatinine 1.01 and bicarbonate 19. CBC and LFTs unremarkable.   Tropes within normal limits. Chest x-ray and EKG unremarkable. Urine Tox negative   Poison control were notified and recommended to monitor in the ICU.   Patient was given a 1 L normal saline IV bolus and later started on IV maintenance fluid. Creatinine normalized. Stopped Iv fluid and started diet. Psychiatry consulted, had tele psych evaluation today and recommended inpatient psychiatry treatment once medically stable. Currently patient is stable enough to be transferred out of ICU. Physical Exam:   Vitals: /61   Pulse 55   Temp 98 °F (36.7 °C) (Oral)   Resp 16   Ht 4' 10\" (1.473 m)   Wt 235 lb 0.2 oz (106.6 kg)   LMP 08/14/2020   SpO2 95%   BMI 49.12 kg/m²   24 hour intake/output:    Intake/Output Summary (Last 24 hours) at 9/15/2020 2137  Last data filed at 9/15/2020 1421  Gross per 24 hour   Intake 1810 ml   Output 200 ml   Net 1610 ml     Last 3 weights: Wt Readings from Last 3 Encounters:   09/14/20 235 lb 0.2 oz (106.6 kg)   06/10/18 200 lb (90.7 kg)   05/13/17 206 lb 12.7 oz (93.8 kg)       General appearance: alert and cooperative with exam  HEENT: Head: Normal, normocephalic, atraumatic.   Neck: no adenopathy, no carotid bruit, no JVD, supple, symmetrical, trachea midline and thyroid not enlarged, symmetric, no tenderness/mass/nodules  Lungs: clear to auscultation bilaterally  Heart: regular rate and rhythm, S1, S2 normal, no murmur, click, rub or gallop  Abdomen: soft, non-tender; bowel sounds normal; no masses,  no organomegaly  Neurologic: Mental status: Alert, oriented, thought content appropriate    Medications:Current Inpatient  Scheduled Meds:   [START ON 9/16/2020] levothyroxine  88 mcg Oral Daily    sodium chloride flush  10 mL Intravenous 2 times per day    enoxaparin  40 mg Subcutaneous Daily     Continuous Infusions:  PRN Meds:sodium chloride flush, acetaminophen **OR** acetaminophen, polyethylene glycol, promethazine **OR** ondansetron, potassium chloride **OR** potassium alternative oral replacement **OR** potassium chloride    Objective:    CBC:   Recent Labs     09/14/20  1157 09/15/20  0609   WBC 8.1 8.6   HGB 12.8 10.7*    317     BMP:    Recent Labs     09/14/20  1157 09/15/20  0609    142   K 4.6 4.4    113*   CO2 19* 22   BUN 14 10   CREATININE 1.01* 0.93*   GLUCOSE 132* 88     Calcium:  Recent Labs     09/15/20  0609   CALCIUM 8.1*     Ionized Calcium:No results for input(s): IONCA in the last 72 hours. Magnesium:  Recent Labs     09/14/20  1157   MG 2.0     Phosphorus:No results for input(s): PHOS in the last 72 hours. BNP:No results for input(s): BNP in the last 72 hours. Glucose:No results for input(s): POCGLU in the last 72 hours. HgbA1C: No results for input(s): LABA1C in the last 72 hours. INR: No results for input(s): INR in the last 72 hours. Hepatic:   Recent Labs     09/15/20  0609   ALKPHOS 58   ALT 8   AST 12   PROT 6.0*   BILITOT 0.28*   LABALBU 3.2*     Amylase and Lipase:No results for input(s): LACTA, AMYLASE in the last 72 hours. Lactic Acid: No results for input(s): LACTA in the last 72 hours. CARDIAC ENZYMES:No results for input(s): CKTOTAL, CKMB, CKMBINDEX, TROPONINI in the last 72 hours. BNP: No results for input(s): BNP in the last 72 hours. Lipids: No results for input(s): CHOL, TRIG, HDL, LDLCALC in the last 72 hours.     Invalid input(s): LDL  ABGs: No results found for: PH, PCO2, PO2, HCO3, O2SAT  Thyroid:   Lab Results   Component Value Date    TSH 0.99 09/14/2020        Urinalysis:   Recent Labs     09/15/20  1427   BACTERIA FEW*   COLORU YELLOW   PHUR 5.5   PROTEINU NEGATIVE   RBCUA TOO NUMEROUS TO COUNT   SPECGRAV 1.012   BILIRUBINUR NEGATIVE   NITRU NEGATIVE   WBCUA 2 TO 5   LEUKOCYTESUR TRACE*   GLUCOSEU NEGATIVE         Assessment:  Patient Active Problem List   Diagnosis    Severe episode of recurrent major depressive disorder, without psychotic features [facility]    Hypothyroidism    Headache    PTSD (post-traumatic stress disorder)    Suicide [facility] ingestion [facility]    Hypothyroidism due to acquired atrophy of thyroid    Suicide attempt ([facility]           Plan:  1. Will resume all of her home psych medications. 2. Will resume Synthyroid for her hypothyroidism  3. We will continue to have a bedside sitter for her  4. Suicide precautions  5. Continue to monitor heart rate as she is running in 50s  6. She will need psych rehab at some facility. She is not willing to go to [facility] as she has some friend there and does not want to go there. 7. DVT prophylaxis: Lovenox  8. Discharge planning: Likely tomorrow      Jocelyn Rich MD  Internal Medicine Resident, PGY-1  [facility];  [place]reras  8/25/2917,8:94 PM

## 2020-09-21 NOTE — DISCHARGE SUMMARY
[facility]     Department of Internal Medicine - Staff Internal Medicine Teaching Service    INPATIENT DISCHARGE SUMMARY      Patient Identification:  Valentino Stains is a 39 y.o. female. :  1975  MRN: [id number]     Acct: [de-identified]   PCP: Jassi Carmona MD  Admit Date:  2020  Discharge date and time: 2020  4:01 AM   Attending Provider: No att. providers found                                     [facility]ists:  Active Problems:    Severe episode of recurrent major depressive disorder, without psychotic features ([facility] [address]    Hypothyroidism    [facility]    Suicide attempt [facility])  Resolved Problems:    * No resolved hospital problems. *      HOSPITAL STAY     Brief Inpatient course:   Valentino Stains is a 39 y.o. female who was admitted for the management of drug overdose with 50 tablets of 10 mg Ambien, 12 tablets of 2 mg in the past, 3 tablets of 20 mg amplify and 3 tablets of 0.5 mg Cogentin. She was hemodynamically stable and started on fluid hydration along with monitoring of vitals. Her labs including CBC, LFTs, drops, EKG and chest x-ray were unremarkable. U tox negative. She was discharged in stable condition to an inpatient psych facility for further psych rehabilitation considering history of severe depression and multiple episodes of suicide attempts. significant therapeutic interventions done at the hospital:   1. Resumed all of her home psych medications. 2. Resumed Synthyroid for her hypothyroidism  3. bedside sitter for her  4. Suicide precautions  5. Continue to monitor heart rate as she is running in 50s  6. She will need psych rehab at some facility.      Procedures/ Significant Interventions:    None    Consults:     Consults:     Final Specialist Recommendations/Findings:   IP CONSULT TO SOCIAL WORK  IP CONSULT TO CRITICAL CARE  IP CONSULT TO Josh Guillory Acquired Infections: none    Discharge Functional Status:  stable    DISCHARGE PLAN     Disposition: Inpatient psych facility    Patient Instructions:   Discharge Medication List as of 9/16/2020  2:54 AM      CONTINUE these medications which have NOT CHANGED    Details   escitalopram (LEXAPRO) 20 MG tablet Take 20 mg by mouth dailyHistorical Med      magnesium oxide (MAG-OX) 400 MG tablet Take 400 mg by mouth dailyHistorical Med      mirtazapine (REMERON) 30 MG tablet Take 30 mg by mouth nightlyHistorical Med      ARIPiprazole (ABILIFY) 20 MG tablet Take 20 mg by mouth dailyHistorical Med      benztropine (COGENTIN) 0.5 MG tablet Take 0.5 mg by mouth dailyHistorical Med      zolpidem (AMBIEN) 10 MG tablet Take 40 mg by mouth nightly as needed for Sleep. Historical Med      clonazePAM (KLONOPIN) 1 MG tablet Take 1 mg by mouth 2 times daily. Collette Ruts Historical Med      ibuprofen (ADVIL;MOTRIN) 600 MG tablet Take 1 tablet by mouth every 6 hours as needed for Pain, Disp-30 tablet, R-0Print      rizatriptan (MAXALT) 10 MG tablet Take 10 mg by mouth once as needed for Migraine May repeat in 2 hours if neededHistorical Med      prazosin (MINIPRESS) 2 MG capsule Take 1 capsule by mouth nightly, Disp-30 capsule, R-0      levothyroxine (SYNTHROID) 88 MCG tablet Take 88 mcg by mouth Daily             Activity: Strict precautions for suicide    Diet: regular diet    Follow-up:    Jason Cano MD  [address] [place] [address]  [phone]            Patient Instructions:   Strict suicide precautions  Psych rehabilitation  Follow-up with psychiatry and PCP  Follow up labs: None  Follow up imaging: None    Note that over 30 minutes was spent in preparing discharge papers, discussing discharge with patient, medication review, etc.      Liyah Gómez MD, MD  Internal Medicine Resident, PGY-1  [facility];  [place], [place]  9/21/2020, 3:24 PM

## 2023-08-10 ENCOUNTER — APPOINTMENT (OUTPATIENT)
Dept: CT IMAGING | Facility: CLINIC | Age: 48
End: 2023-08-10
Payer: COMMERCIAL

## 2023-08-10 ENCOUNTER — HOSPITAL ENCOUNTER (EMERGENCY)
Facility: CLINIC | Age: 48
Discharge: HOME | End: 2023-08-10
Attending: EMERGENCY MEDICINE
Payer: COMMERCIAL

## 2023-08-10 ENCOUNTER — APPOINTMENT (OUTPATIENT)
Dept: GENERAL RADIOLOGY | Facility: CLINIC | Age: 48
End: 2023-08-10
Attending: EMERGENCY MEDICINE
Payer: COMMERCIAL

## 2023-08-10 VITALS
TEMPERATURE: 97.9 F | RESPIRATION RATE: 16 BRPM | OXYGEN SATURATION: 97 % | BODY MASS INDEX: 41.98 KG/M2 | HEART RATE: 75 BPM | SYSTOLIC BLOOD PRESSURE: 107 MMHG | HEIGHT: 58 IN | DIASTOLIC BLOOD PRESSURE: 62 MMHG | WEIGHT: 200 LBS

## 2023-08-10 DIAGNOSIS — R42 VERTIGO: ICD-10-CM

## 2023-08-10 DIAGNOSIS — R42 DIZZINESS: Primary | ICD-10-CM

## 2023-08-10 LAB
ALBUMIN SERPL-MCNC: 4.1 G/DL (ref 3.5–5.2)
ALBUMIN/GLOB SERPL: 1.4 {RATIO} (ref 1–2.5)
ALP SERPL-CCNC: 89 U/L (ref 35–104)
ALT SERPL-CCNC: 7 U/L (ref 5–33)
ANION GAP SERPL CALCULATED.3IONS-SCNC: 11 MMOL/L (ref 9–17)
AST SERPL-CCNC: 12 U/L
BACTERIA URNS QL MICRO: ABNORMAL
BASOPHILS # BLD: 0.1 K/UL (ref 0–0.2)
BASOPHILS NFR BLD: 1 % (ref 0–2)
BILIRUB SERPL-MCNC: 0.3 MG/DL (ref 0.3–1.2)
BILIRUB UR QL STRIP: NEGATIVE
BUN SERPL-MCNC: 14 MG/DL (ref 6–20)
CALCIUM SERPL-MCNC: 9 MG/DL (ref 8.6–10.4)
CHARACTER UR: ABNORMAL
CHLORIDE SERPL-SCNC: 103 MMOL/L (ref 98–107)
CLARITY UR: CLEAR
CO2 SERPL-SCNC: 24 MMOL/L (ref 20–31)
COLOR UR: YELLOW
CREAT SERPL-MCNC: 0.8 MG/DL (ref 0.5–0.9)
EOSINOPHIL # BLD: 0.1 K/UL (ref 0–0.4)
EOSINOPHILS RELATIVE PERCENT: 1 % (ref 1–4)
EPI CELLS #/AREA URNS HPF: ABNORMAL /HPF (ref 0–5)
ERYTHROCYTE [DISTWIDTH] IN BLOOD BY AUTOMATED COUNT: 14.9 % (ref 12.5–15.4)
GFR SERPL CREATININE-BSD FRML MDRD: >60 ML/MIN/1.73M2
GLUCOSE SERPL-MCNC: 99 MG/DL (ref 70–99)
GLUCOSE UR STRIP-MCNC: NEGATIVE MG/DL
HCG SERPL QL: NEGATIVE
HCT VFR BLD AUTO: 39.2 % (ref 36–46)
HGB BLD-MCNC: 12.8 G/DL (ref 12–16)
HGB UR QL STRIP.AUTO: ABNORMAL
KETONES UR STRIP-MCNC: NEGATIVE MG/DL
LEUKOCYTE ESTERASE UR QL STRIP: NEGATIVE
LYMPHOCYTES NFR BLD: 3.1 K/UL (ref 1–4.8)
LYMPHOCYTES RELATIVE PERCENT: 26 % (ref 24–44)
MCH RBC QN AUTO: 27.4 PG (ref 26–34)
MCHC RBC AUTO-ENTMCNC: 32.6 G/DL (ref 31–37)
MCV RBC AUTO: 83.9 FL (ref 80–100)
MONOCYTES NFR BLD: 0.7 K/UL (ref 0.1–1.2)
MONOCYTES NFR BLD: 6 % (ref 2–11)
NEUTROPHILS NFR BLD: 66 % (ref 36–66)
NEUTS SEG NFR BLD: 8 K/UL (ref 1.8–7.7)
NITRITE UR QL STRIP: NEGATIVE
PH UR STRIP: 5.5 [PH] (ref 5–8)
PLATELET # BLD AUTO: 333 K/UL (ref 140–450)
PMV BLD AUTO: 7.4 FL (ref 6–12)
POTASSIUM SERPL-SCNC: 3.9 MMOL/L (ref 3.7–5.3)
PROT SERPL-MCNC: 7 G/DL (ref 6.4–8.3)
PROT UR STRIP-MCNC: NEGATIVE MG/DL
RBC # BLD AUTO: 4.67 M/UL (ref 4–5.2)
RBC #/AREA URNS HPF: ABNORMAL /HPF (ref 0–2)
SODIUM SERPL-SCNC: 138 MMOL/L (ref 135–144)
SP GR UR STRIP: 1.01 (ref 1–1.03)
TROPONIN I SERPL HS-MCNC: <6 NG/L (ref 0–14)
UROBILINOGEN UR STRIP-ACNC: NORMAL EU/DL (ref 0–1)
WBC #/AREA URNS HPF: ABNORMAL /HPF (ref 0–5)
WBC OTHER # BLD: 12 K/UL (ref 3.5–11)

## 2023-08-10 PROCEDURE — 84484 ASSAY OF TROPONIN QUANT: CPT

## 2023-08-10 PROCEDURE — 6370000000 HC RX 637 (ALT 250 FOR IP): Performed by: EMERGENCY MEDICINE

## 2023-08-10 PROCEDURE — 71045 X-RAY EXAM CHEST 1 VIEW: CPT

## 2023-08-10 PROCEDURE — 85025 COMPLETE CBC W/AUTO DIFF WBC: CPT

## 2023-08-10 PROCEDURE — 6360000002 HC RX W HCPCS: Performed by: EMERGENCY MEDICINE

## 2023-08-10 PROCEDURE — 80053 COMPREHEN METABOLIC PANEL: CPT

## 2023-08-10 PROCEDURE — 70450 CT HEAD/BRAIN W/O DYE: CPT

## 2023-08-10 PROCEDURE — 99285 EMERGENCY DEPT VISIT HI MDM: CPT

## 2023-08-10 PROCEDURE — 81001 URINALYSIS AUTO W/SCOPE: CPT

## 2023-08-10 PROCEDURE — 93005 ELECTROCARDIOGRAM TRACING: CPT | Performed by: EMERGENCY MEDICINE

## 2023-08-10 PROCEDURE — 96374 THER/PROPH/DIAG INJ IV PUSH: CPT

## 2023-08-10 PROCEDURE — 36415 COLL VENOUS BLD VENIPUNCTURE: CPT

## 2023-08-10 PROCEDURE — 2580000003 HC RX 258: Performed by: EMERGENCY MEDICINE

## 2023-08-10 PROCEDURE — 84703 CHORIONIC GONADOTROPIN ASSAY: CPT

## 2023-08-10 RX ORDER — ONDANSETRON 2 MG/ML
4 INJECTION INTRAMUSCULAR; INTRAVENOUS ONCE
Status: COMPLETED | OUTPATIENT
Start: 2023-08-10 | End: 2023-08-10

## 2023-08-10 RX ORDER — MECLIZINE HCL 12.5 MG/1
25 TABLET ORAL ONCE
Status: COMPLETED | OUTPATIENT
Start: 2023-08-10 | End: 2023-08-10

## 2023-08-10 RX ORDER — 0.9 % SODIUM CHLORIDE 0.9 %
1000 INTRAVENOUS SOLUTION INTRAVENOUS ONCE
Status: COMPLETED | OUTPATIENT
Start: 2023-08-10 | End: 2023-08-10

## 2023-08-10 RX ORDER — MECLIZINE HYDROCHLORIDE 25 MG/1
25 TABLET ORAL 3 TIMES DAILY PRN
Qty: 15 TABLET | Refills: 0 | Status: SHIPPED | OUTPATIENT
Start: 2023-08-10 | End: 2023-08-15

## 2023-08-10 RX ADMIN — SODIUM CHLORIDE 1000 ML: 9 INJECTION, SOLUTION INTRAVENOUS at 11:01

## 2023-08-10 RX ADMIN — MECLIZINE 25 MG: 12.5 TABLET ORAL at 11:02

## 2023-08-10 RX ADMIN — ONDANSETRON 4 MG: 2 INJECTION INTRAMUSCULAR; INTRAVENOUS at 11:02

## 2023-08-10 ASSESSMENT — ENCOUNTER SYMPTOMS
SORE THROAT: 0
BACK PAIN: 0
SHORTNESS OF BREATH: 0
COUGH: 0
VOMITING: 0
NAUSEA: 0
DIARRHEA: 0
ABDOMINAL PAIN: 0
WHEEZING: 0

## 2023-08-10 ASSESSMENT — LIFESTYLE VARIABLES
HOW OFTEN DO YOU HAVE A DRINK CONTAINING ALCOHOL: NEVER
HOW MANY STANDARD DRINKS CONTAINING ALCOHOL DO YOU HAVE ON A TYPICAL DAY: PATIENT DOES NOT DRINK

## 2023-08-10 ASSESSMENT — PAIN SCALES - GENERAL: PAINLEVEL_OUTOF10: 7

## 2023-08-10 ASSESSMENT — PAIN - FUNCTIONAL ASSESSMENT: PAIN_FUNCTIONAL_ASSESSMENT: 0-10

## 2023-08-10 NOTE — DISCHARGE INSTRUCTIONS
Return to the emergency department symptoms worsen or persist.  Follow-up with your family doctor in 1 to 2 days. Take the dizzy medicine as prescribed, antivert,  as needed.

## 2023-08-10 NOTE — ED PROVIDER NOTES
[facility]  [address]  Phone: [phone]    eMERGENCY dEPARTMENT eNCOUnter        Pt Name: Silver Abreu  MRN: [id number]  [facility] 1975  Date of evaluation: 8/10/23    CHIEF COMPLAINT     Chief Complaint   Patient presents with    Headache    Dizziness       HISTORY OF PRESENT ILLNESS    Silver Abreu is a 52 y.o. female who presents to the emergency department with dizziness. She stated she feels like her head spinning. Began yesterday in the morning and just seemed to progress throughout the day intensifying. She felt like she was going to fall over because she felt really shaky with the dizziness. Last night she began to have nausea and vomiting as a result. She stated she woke up this morning felt better but as the days progressed with change in position she felt the dizziness is intensifying. She drove herself here from work. The patient denies any falls injury or trauma. No syncopal episodes or seizure activity. No fever chills cough congestion sore throat or ear pain. No sinus pressure or congestion. No chest pain or shortness of breath. No abdominal pain. No diarrhea. No recent illness or sick contacts. She denies any weakness numbness confusion or slurred speech. REVIEW OF SYSTEMS     Review of Systems   Constitutional:  Negative for chills and fever. HENT:  Negative for congestion, ear pain and sore throat. Respiratory:  Negative for cough, shortness of breath and wheezing. Cardiovascular:  Negative for chest pain, palpitations and leg swelling. Gastrointestinal:  Negative for abdominal pain, diarrhea, nausea and vomiting. Genitourinary:  Negative for dysuria, flank pain, frequency and hematuria. Musculoskeletal:  Negative for back pain. Skin:  Negative for rash. Neurological:  Positive for light-headedness. Negative for dizziness, numbness and headaches.      PAST MEDICAL HISTORY    has a past medical history of Depression, Final Result   No acute intracranial abnormality. XR CHEST 1 VIEW   Final Result   No radiographic evidence of an acute cardiopulmonary process. I have reviewed the disposition diagnosis with the patient and or their family/guardian. I have answered their questions and givendischarge instructions. They voiced understanding of these instructions and did not have any further questions or complaints. PROCEDURES:  Unless otherwise noted below, none     Procedures    FINAL IMPRESSION      1. Dizziness    2. Vertigo          DISPOSITION/PLAN   DISPOSITION Decision To Discharge 08/10/2023 12:58:15 PM      PATIENT REFERRED TO:  No follow-up provider specified.     DISCHARGE MEDICATIONS:  Discharge Medication List as of 8/10/2023  1:07 PM        START taking these medications    Details   meclizine (ANTIVERT) 25 MG tablet Take 1 tablet by mouth 3 times daily as needed for Dizziness, Disp-15 tablet, R-0Print                (Please note that portions of this note were completed with a voice recognition program.  Efforts were made to edit the dictations but occasionally words are mis-transcribed.)    Foreign Gordon DO,(electronically signed)  Board Certified Emergency Physician       Anjum Perez  08/14/23 [id number]

## 2023-08-10 NOTE — ED TRIAGE NOTES
Patient presents with c/o headache, dizziness, nausea since last night, denies weakness, numbness or tingling at this time

## 2023-08-11 LAB
EKG ATRIAL RATE: 67 BPM
EKG P AXIS: 42 DEGREES
EKG P-R INTERVAL: 146 MS
EKG Q-T INTERVAL: 426 MS
EKG QRS DURATION: 92 MS
EKG QTC CALCULATION (BAZETT): 450 MS
EKG R AXIS: 8 DEGREES
EKG T AXIS: -2 DEGREES
EKG VENTRICULAR RATE: 67 BPM